# Patient Record
Sex: MALE | URBAN - METROPOLITAN AREA
[De-identification: names, ages, dates, MRNs, and addresses within clinical notes are randomized per-mention and may not be internally consistent; named-entity substitution may affect disease eponyms.]

---

## 2021-04-12 NOTE — PATIENT DISCUSSION
detailed refraction, no indication for eyeglass prescription change based on today's finding, recheck after artificial tears.

## 2021-04-12 NOTE — PATIENT DISCUSSION
significant staining, significant redness, instructed to utilize warm compress several times per day, blink artificial tears and gel drops at least 4 times per day, increase omega-3 in the diet, return in 2 weeks for office call. One hour spent directly with patient today. Subjective testing soft, retry refraction?

## 2021-04-12 NOTE — PATIENT DISCUSSION
return in 2 weeks, check blood pressure, patient taking blood pressure medicine only if pressure is elevated. Return in 6 months for repeat dilation. Send office notes to primary care physician.

## 2021-04-21 ENCOUNTER — IMPORTED ENCOUNTER (OUTPATIENT)
Dept: URBAN - METROPOLITAN AREA CLINIC 9 | Facility: CLINIC | Age: 76
End: 2021-04-21

## 2021-04-26 NOTE — PATIENT DISCUSSION
detailed repeat refraction, visual acuity consistent with level of dry eye and cataract. Recheck in 6-12 months. Explained limitations of changing eyeglasses prescription. Shae Morales

## 2021-04-26 NOTE — PATIENT DISCUSSION
unable to get blood pressure reading today with automated cough. Small arms size. Send letter to primary care physician. Patient going to a new physician and will get information to us in the near future. Return in 6 months for dilation. Follow-up PRN.

## 2021-04-26 NOTE — PATIENT DISCUSSION
significant staining, mild redness instructed to continue warm compress several times per day, blink artificial tears and gel drops at least 4 times per day, increase omega-3 in the diet,   recheck in 6 months. Follow-up PRN.

## 2021-05-11 ENCOUNTER — IMPORTED ENCOUNTER (OUTPATIENT)
Dept: URBAN - METROPOLITAN AREA CLINIC 9 | Facility: CLINIC | Age: 76
End: 2021-05-11

## 2021-10-18 ASSESSMENT — VISUAL ACUITY
OS_CC: 20/50 - SN
OD_SC: 20/40 -2 SN
OD_CC: 20/30 - SN
OS_CC: 20/40 - SN
OD_CC: 20/40 -2 SN
OS_SC: 20/60 - SN
OS_SC: 20/50 - SN
OD_SC: 20/40 -2 SN

## 2021-10-18 ASSESSMENT — TONOMETRY
OS_IOP_MMHG: 18
OS_IOP_MMHG: 15
OD_IOP_MMHG: 19
OD_IOP_MMHG: 14

## 2022-02-18 NOTE — PATIENT DISCUSSION
significant staining, mild redness instructed to continue warm compress several times per day, blink artificial tears and gel drops at least 4 times per day, increase omega-3 in the diet,.